# Patient Record
Sex: FEMALE | Race: WHITE | Employment: FULL TIME | URBAN - METROPOLITAN AREA
[De-identification: names, ages, dates, MRNs, and addresses within clinical notes are randomized per-mention and may not be internally consistent; named-entity substitution may affect disease eponyms.]

---

## 2017-01-20 ENCOUNTER — OFFICE VISIT (OUTPATIENT)
Dept: SURGERY | Age: 42
End: 2017-01-20

## 2017-01-20 VITALS
OXYGEN SATURATION: 99 % | HEIGHT: 64 IN | HEART RATE: 71 BPM | RESPIRATION RATE: 18 BRPM | WEIGHT: 160 LBS | DIASTOLIC BLOOD PRESSURE: 65 MMHG | BODY MASS INDEX: 27.31 KG/M2 | SYSTOLIC BLOOD PRESSURE: 109 MMHG

## 2017-01-20 DIAGNOSIS — Z09 POSTOPERATIVE EXAMINATION: Primary | ICD-10-CM

## 2017-01-20 NOTE — PROGRESS NOTES
Surgery  Follow up  Procedure: excision right axillary mass  OR date:  11/3/2016  Path:       Right axillary soft tissue mass, excision:   Mature adipose tissue, rule out lipoma. Few benign breast ducts present at periphery of specimen. S I feel sore, no drainage.   Limited ROM is improving wit PT    Visit Vitals    /65 (BP 1 Location: Left arm, BP Patient Position: Sitting)    Pulse 71    Resp 18    Ht 5' 4\" (1.626 m)    Wt 72.6 kg (160 lb)    SpO2 99%    BMI 27.46 kg/m2       O Incisions healing well without infection   Some limited ROM    A/P Doing better         RTC prn    Herberth Lopes MD FACS

## 2017-01-20 NOTE — MR AVS SNAPSHOT
Visit Information Date & Time Provider Department Dept. Phone Encounter #  
 1/20/2017 10:40 AM Benjamin Campa MD [facility] [address] [phone] [id number] Upcoming Health Maintenance Date Due DTaP/Tdap/Td series (1 - Tdap) 3/9/1994 PAP AKA CERVICAL CYTOLOGY 3/9/1994 INFLUENZA AGE 9 TO ADULT 8/1/2016 Allergies as of 1/20/2017  Review Complete On: 1/20/2017 By: Benjamin Campa MD  
  
 Severity Noted Reaction Type Reactions Shellfish Derived  11/03/2016    Other (comments) Throat tightness Current Immunizations  Never Reviewed No immunizations on file. Not reviewed this visit You Were Diagnosed With   
  
 Codes Comments Postoperative examination    -  Primary ICD-10-CM: P11 ICD-9-CM: V67.00 Vitals BP Pulse Resp Height(growth percentile) Weight(growth percentile) SpO2  
 109/65 (BP 1 Location: Left arm, BP Patient Position: Sitting) 71 18 5' 4\" (1.626 m) 160 lb (72.6 kg) 99% BMI Smoking Status 27.46 kg/m2 Never Smoker BMI and BSA Data Body Mass Index Body Surface Area  
 27.46 kg/m 2 1.81 m 2 Preferred Pharmacy Pharmacy Name Phone [facility], [address] [place] [address] [phone] Your Updated Medication List  
  
   
This list is accurate as of: 1/20/17  4:20 PM.  Always use your most recent med list.  
  
  
  
  
 multivitamin tablet Commonly known as:  ONE A DAY Take 1 Tab by mouth daily. Indications: VITAMIN DEFICIENCY PREVENTION Introducing [facility]! [facility] introduces [employer] patient portal. Now you can access parts of your medical record, email your doctor's office, and request medication refills online. 1. In your internet browser, go to [email or web address] 2. Click on the First Time User? Click Here link in the Sign In box.  You will see the New Member Sign Up page. 3. Enter your [employer] Access Code exactly as it appears below. You will not need to use this code after youve completed the sign-up process. If you do not sign up before the expiration date, you must request a new code. · [employer] Access Code: [email or web address] Expires: 4/20/2017 10:35 AM 
 
4. Enter the last four digits of your Social Security Number (xxxx) and Date of Birth (mm/dd/yyyy) as indicated and click Submit. You will be taken to the next sign-up page. 5. Create a [employer] ID. This will be your [employer] login ID and cannot be changed, so think of one that is secure and easy to remember. 6. Create a [employer] password. You can change your password at any time. 7. Enter your Password Reset Question and Answer. This can be used at a later time if you forget your password. 8. Enter your e-mail address. You will receive e-mail notification when new information is available in [address] 9. Click Sign Up. You can now view and download portions of your medical record. 10. Click the Download Summary menu link to download a portable copy of your medical information. If you have questions, please visit the Frequently Asked Questions section of the [employer] website. Remember, [employer] is NOT to be used for urgent needs. For medical emergencies, dial 911. Now available from your iPhone and Android! Please provide this summary of care documentation to your next provider. Your primary care clinician is listed as April Bansal. If you have any questions after today's visit, please call [phone].